# Patient Record
Sex: MALE
[De-identification: names, ages, dates, MRNs, and addresses within clinical notes are randomized per-mention and may not be internally consistent; named-entity substitution may affect disease eponyms.]

---

## 2021-10-20 ENCOUNTER — HOSPITAL ENCOUNTER (EMERGENCY)
Dept: HOSPITAL 53 - M ED | Age: 26
LOS: 1 days | Discharge: HOME | End: 2021-10-21
Payer: COMMERCIAL

## 2021-10-20 VITALS — WEIGHT: 165.13 LBS | BODY MASS INDEX: 26.54 KG/M2 | HEIGHT: 66 IN
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
## 2021-10-20 DIAGNOSIS — S52.311A: Primary | ICD-10-CM
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
## 2021-10-21 VITALS — SYSTOLIC BLOOD PRESSURE: 140 MMHG | DIASTOLIC BLOOD PRESSURE: 87 MMHG
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
## 2021-11-02 ENCOUNTER — HOSPITAL ENCOUNTER (OUTPATIENT)
Dept: HOSPITAL 53 - M SDC | Age: 26
Discharge: HOME | End: 2021-11-02
Attending: STUDENT IN AN ORGANIZED HEALTH CARE EDUCATION/TRAINING PROGRAM
Payer: COMMERCIAL

## 2021-11-02 VITALS — BODY MASS INDEX: 25.71 KG/M2 | HEIGHT: 66 IN | WEIGHT: 160 LBS

## 2021-11-02 VITALS — SYSTOLIC BLOOD PRESSURE: 137 MMHG | DIASTOLIC BLOOD PRESSURE: 81 MMHG
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
## 2021-11-02 DIAGNOSIS — T84.89XA: ICD-10-CM

## 2021-11-02 DIAGNOSIS — F17.210: ICD-10-CM
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
## 2021-11-02 DIAGNOSIS — Z87.81: ICD-10-CM

## 2021-11-02 DIAGNOSIS — S52.321A: Primary | ICD-10-CM

## 2021-11-02 PROCEDURE — 76000 FLUOROSCOPY <1 HR PHYS/QHP: CPT

## 2021-11-02 PROCEDURE — 25515 OPTX RADIAL SHAFT FRACTURE: CPT

## 2021-11-02 PROCEDURE — 20680 REMOVAL OF IMPLANT DEEP: CPT

## 2021-11-02 RX ADMIN — FENTANYL CITRATE PRN MCG: 50 INJECTION, SOLUTION INTRAMUSCULAR; INTRAVENOUS at 18:06

## 2021-11-02 RX ADMIN — MORPHINE SULFATE PRN MG: 2 INJECTION, SOLUTION INTRAMUSCULAR; INTRAVENOUS at 18:40

## 2021-11-02 RX ADMIN — FENTANYL CITRATE PRN MCG: 50 INJECTION, SOLUTION INTRAMUSCULAR; INTRAVENOUS at 17:50

## 2021-11-02 RX ADMIN — FENTANYL CITRATE PRN MCG: 50 INJECTION, SOLUTION INTRAMUSCULAR; INTRAVENOUS at 18:00

## 2021-11-02 RX ADMIN — MORPHINE SULFATE PRN MG: 2 INJECTION, SOLUTION INTRAMUSCULAR; INTRAVENOUS at 18:13

## 2021-11-02 RX ADMIN — FENTANYL CITRATE PRN MCG: 50 INJECTION, SOLUTION INTRAMUSCULAR; INTRAVENOUS at 17:55
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
## 2021-11-02 NOTE — REP
INDICATION:

RIGHT RADIUS OPEN REDUCTION/ HARDWARE REMOVAL.



COMPARISON:

Radiographs 10/20/2021.



TECHNIQUE:

Multiple C-arm views right radius.



FINDINGS:

Metallic plate and multiple screws are seen transfixing a fracture of the radius.  The

osseous structures appear well aligned.



IMPRESSION:

43 seconds fluoroscopy time utilized.





<Electronically signed by Kamari Valadez > 11/02/21 2429